# Patient Record
Sex: MALE | Race: WHITE | Employment: FULL TIME | URBAN - METROPOLITAN AREA
[De-identification: names, ages, dates, MRNs, and addresses within clinical notes are randomized per-mention and may not be internally consistent; named-entity substitution may affect disease eponyms.]

---

## 2018-04-20 ENCOUNTER — OFFICE VISIT (OUTPATIENT)
Dept: FAMILY MEDICINE | Facility: CLINIC | Age: 38
End: 2018-04-20
Payer: COMMERCIAL

## 2018-04-20 VITALS
SYSTOLIC BLOOD PRESSURE: 134 MMHG | TEMPERATURE: 98 F | DIASTOLIC BLOOD PRESSURE: 80 MMHG | WEIGHT: 218 LBS | BODY MASS INDEX: 29.53 KG/M2 | HEART RATE: 74 BPM | HEIGHT: 72 IN | RESPIRATION RATE: 16 BRPM

## 2018-04-20 DIAGNOSIS — F41.1 GAD (GENERALIZED ANXIETY DISORDER): Primary | ICD-10-CM

## 2018-04-20 PROCEDURE — 99203 OFFICE O/P NEW LOW 30 MIN: CPT | Performed by: NURSE PRACTITIONER

## 2018-04-20 RX ORDER — BUSPIRONE HYDROCHLORIDE 10 MG/1
10 TABLET ORAL 3 TIMES DAILY
Qty: 90 TABLET | Refills: 0 | Status: SHIPPED | OUTPATIENT
Start: 2018-04-20 | End: 2018-05-18

## 2018-04-20 ASSESSMENT — ANXIETY QUESTIONNAIRES
3. WORRYING TOO MUCH ABOUT DIFFERENT THINGS: NEARLY EVERY DAY
1. FEELING NERVOUS, ANXIOUS, OR ON EDGE: NEARLY EVERY DAY
4. TROUBLE RELAXING: NEARLY EVERY DAY
2. NOT BEING ABLE TO STOP OR CONTROL WORRYING: NEARLY EVERY DAY
6. BECOMING EASILY ANNOYED OR IRRITABLE: NEARLY EVERY DAY
5. BEING SO RESTLESS THAT IT IS HARD TO SIT STILL: NOT AT ALL
GAD7 TOTAL SCORE: 18
7. FEELING AFRAID AS IF SOMETHING AWFUL MIGHT HAPPEN: NEARLY EVERY DAY
GAD7 TOTAL SCORE: 18
GAD7 TOTAL SCORE: 18
7. FEELING AFRAID AS IF SOMETHING AWFUL MIGHT HAPPEN: NEARLY EVERY DAY

## 2018-04-20 ASSESSMENT — PATIENT HEALTH QUESTIONNAIRE - PHQ9
SUM OF ALL RESPONSES TO PHQ QUESTIONS 1-9: 3
SUM OF ALL RESPONSES TO PHQ QUESTIONS 1-9: 3
10. IF YOU CHECKED OFF ANY PROBLEMS, HOW DIFFICULT HAVE THESE PROBLEMS MADE IT FOR YOU TO DO YOUR WORK, TAKE CARE OF THINGS AT HOME, OR GET ALONG WITH OTHER PEOPLE: SOMEWHAT DIFFICULT

## 2018-04-20 ASSESSMENT — PAIN SCALES - GENERAL: PAINLEVEL: NO PAIN (0)

## 2018-04-20 NOTE — MR AVS SNAPSHOT
"              After Visit Summary   2018    Sidney Altamirano    MRN: [id number]           Patient Information     Date Of Birth          1980        Visit Information        Provider Department      2018 10:40 AM Elma Gaona APRN CNP [facility]        Today's Diagnoses     SUHAS (generalized anxiety disorder)    -  1       Follow-ups after your visit        Who to contact     If you have questions or need follow up information about today's clinic visit or your schedule please contact [facility] directly at [phone].  Normal or non-critical lab and imaging results will be communicated to you by Humhart, letter or phone within 4 business days after the clinic has received the results. If you do not hear from us within 7 days, please contact the clinic through Humhart or phone. If you have a critical or abnormal lab result, we will notify you by phone as soon as possible.  Submit refill requests through [employer] or call your pharmacy and they will forward the refill request to us. Please allow 3 business days for your refill to be completed.          Additional Information About Your Visit        MyChart Information     [employer] lets you send messages to your doctor, view your test results, renew your prescriptions, schedule appointments and more. To sign up, go to [email or web address] . Click on \"Log in\" on the left side of the screen, which will take you to the Welcome page. Then click on \"Sign up Now\" on the right side of the page.     You will be asked to enter the access code listed below, as well as some personal information. Please follow the directions to create your username and password.     Your access code is: [email or web address]  Expires: 2018 11:16 AM     Your access code will  in 90 days. If you need help or a new code, please call your [facility] or [phone].        Care EveryWhere ID     This is your Care EveryWhere ID. This could be used by " other organizations to access your [place] medical records  [id number]        Your Vitals Were     Pulse Temperature Respirations Height BMI (Body Mass Index)       74 98  F (36.7  C) (Temporal) 16 6' (1.829 m) 29.57 kg/m2        Blood Pressure from Last 3 Encounters:   04/20/18 134/80   07/10/07 138/90   02/08/07 126/82    Weight from Last 3 Encounters:   04/20/18 218 lb (98.9 kg)   07/10/07 215 lb (97.5 kg)   02/08/07 211 lb (95.7 kg)              Today, you had the following     No orders found for display         Today's Medication Changes          These changes are accurate as of 4/20/18 11:16 AM.  If you have any questions, ask your nurse or doctor.               Start taking these medicines.        Dose/Directions    busPIRone 10 MG tablet   Commonly known as:  BUSPAR   Used for:  SUHAS (generalized anxiety disorder)   Started by:  Elma Gaona APRN CNP        Dose:  10 mg   Take 1 tablet (10 mg) by mouth 3 times daily   Quantity:  90 tablet   Refills:  0       sertraline 50 MG tablet   Commonly known as:  ZOLOFT   Used for:  SUHAS (generalized anxiety disorder)   Started by:  Elma Gaona APRN CNP        Dose:  50 mg   Take 1 tablet (50 mg) by mouth daily   Quantity:  30 tablet   Refills:  1            Where to get your medicines      These medications were sent to [facility] [place] - [address]  [address] [place] [address], [place] [address]     Phone:  [phone]     busPIRone 10 MG tablet    sertraline 50 MG tablet                Primary Care Provider Office Phone # Fax #    Romario Barakat [phone] [address] [facility] [address]        Equal Access to Services     JODY CASTELLANOS : Veronica Henson, ely juarez, payal kaalmada kelsea, poly gonzalez. [facility] [phone].    ATENCIÓN: Si habla español, tiene a woo disposición servicios gratuitos de asistencia  lingüísticaPrashanth Rubin al [phone].    We comply with applicable federal civil rights laws and [place] laws. We do not discriminate on the basis of race, color, national origin, age, disability, sex, sexual orientation, or gender identity.            Thank you!     Thank you for choosing [facility]  for your care. Our goal is always to provide you with excellent care. Hearing back from our patients is one way we can continue to improve our services. Please take a few minutes to complete the written survey that you may receive in the mail after your visit with us. Thank you!             Your Updated Medication List - Protect others around you: Learn how to safely use, store and throw away your medicines at www.disposemymeds.org.          This list is accurate as of 4/20/18 11:16 AM.  Always use your most recent med list.                   Brand Name Dispense Instructions for use Diagnosis    busPIRone 10 MG tablet    BUSPAR    90 tablet    Take 1 tablet (10 mg) by mouth 3 times daily    SUHAS (generalized anxiety disorder)       sertraline 50 MG tablet    ZOLOFT    30 tablet    Take 1 tablet (50 mg) by mouth daily    SUHAS (generalized anxiety disorder)       VIAGRA 50 MG tablet   Generic drug:  sildenafil     10    ONE TABLET TAKEN AT LEAST 30 MINUTES BEFORE INTERCOURSE; pt will call to order    Impotence of organic origin

## 2018-04-20 NOTE — PROGRESS NOTES
SUBJECTIVE:   Sidney Altamirano is a 38 year old male who presents to clinic today for the following health issues:    Had tried Buspirone 5 mg in the past. Patient would like to go back on the medication ( see care every where at [facility] ).   History of Present Illness     Depression & Anxiety Follow-up:     Depression/Anxiety:  Depression & Anxiety    Status since last visit::  Worsened    Other associated symptoms of depression and anxiety::  None    Significant life event::  YES    Current substance use::  None       Today's PHQ-9         PHQ-9 Total Score:     (P) 3   PHQ-9 Q9 Suicidal ideation:   (P) Not at all   Thoughts of suicide or self harm:      Self-harm Plan:        Self-harm Action:          Safety concerns for self or others:       SUHAS-7 Total Score: (P) 18    Diet:  Regular (no restrictions)  Frequency of exercise:  None  Taking medications regularly:  Yes  Medication side effects:  Not applicable  Additional concerns today:  No    Abnormal Mood Symptoms  Onset: the last year 1.5 years    Description:   Depression: YES- situational   Anxiety: YES    Accompanying Signs & Symptoms:  Still participating in activities that you used to enjoy: no  Fatigue: no   Irritability: YES  Difficulty concentrating: no   Changes in appetite: no   Problems with sleep: no   Heart racing/beating fast : no   Thoughts of hurting yourself or others: none    History:   Recent stress: YES- family   Prior depression hospitalization: None  Family history of depression: no   Family history of anxiety: no     Precipitating factors:   Alcohol/drug use: not for 1 year.      Alleviating factors:      Therapies Tried and outcome: Buspar (Buspirone)  Problem list and histories reviewed & adjusted, as indicated.  Additional history: as documented    Patient reports to the clinic concerning anxiety and some depressive symptoms and possible medication treatment for it. He is a  and father. He has a diagnosis of low  "testosterone and he used get shots when he was on his ex-wife's insurance, but since his divorce his insurance now doesn't cover it or his previous anxiety meds (Buspirone and Zoloft in the past). He states that his ex-wife was having an affair with her boss for 3 years and that is what caused them to get a divorce. However, she tried to convince him that he was paranoid and anxious, not that she was cheating on him and so he is skeptical of his diagnosis. He has 50/50 custody of his kids with his ex-wife and his fiance has kids as well. He does not have a diagnosis of depression but he notes his fiance of 3 years has stage 4 cervical cancer with metastasis, so he has some slight depression concerning that. He states that they have not had any physical intimacy for over a year, which is hard as his \"love language\" is physical touch.    He notes that he has irritability and is currently in counseling. He states that her fiance's kids are not respectful to her and so he is irritated by that and tries so correct them, but also doesn't want to parent her kids. He reports that he has not been exercising as he is stressed and dealing with lots of doctors and insurance has drained him of energy.    Patient Active Problem List   Diagnosis     Hyperlipidemia     SUHAS (generalized anxiety disorder)     Low testosterone in male     Past Surgical History:   Procedure Laterality Date     VASECTOMY  2012       Social History   Substance Use Topics     Smoking status: Never Smoker     Smokeless tobacco: Never Used     Alcohol use No     Family History   Problem Relation Age of Onset     Hypertension Father      C.A.D. Father      C.A.D. Paternal Grandfather          Current Outpatient Prescriptions   Medication Sig Dispense Refill     busPIRone (BUSPAR) 10 MG tablet Take 1 tablet (10 mg) by mouth 3 times daily 90 tablet 0     sertraline (ZOLOFT) 50 MG tablet Take 1 tablet (50 mg) by mouth daily 30 tablet 1     VIAGRA 50 MG OR TABS " ONE TABLET TAKEN AT LEAST 30 MINUTES BEFORE INTERCOURSE; pt will call to order (Patient not taking: No sig reported) 10 1 YEAR     Allergies   Allergen Reactions     Ceclor [Cefaclor]      Penicillin [Esters]      No lab results found.   BP Readings from Last 3 Encounters:   04/20/18 134/80   07/10/07 138/90   02/08/07 126/82    Wt Readings from Last 3 Encounters:   04/20/18 218 lb (98.9 kg)   07/10/07 215 lb (97.5 kg)   02/08/07 211 lb (95.7 kg)        ROS:  Constitutional, HEENT, cardiovascular, pulmonary, GI, , musculoskeletal, neuro, skin, endocrine and psych systems are negative, except as otherwise noted.    This document serves as a record of the services and decisions personally performed and made by Elma Gaona CNP. It was created on his/her behalf by Morenita Hallman, trained medical scribe. The creation of this document is based the provider's statements to the medical scribes.    Scribe Morenita Hallman 10:52 AM, April 20, 2018  OBJECTIVE:     /80  Pulse 74  Temp 98  F (36.7  C) (Temporal)  Resp 16  Ht 6' (1.829 m)  Wt 218 lb (98.9 kg)  BMI 29.57 kg/m2  Body mass index is 29.57 kg/(m^2).     GENERAL: healthy, alert and no distress  NECK: no adenopathy, no asymmetry, masses, or scars and thyroid normal to palpation  RESP: lungs clear to auscultation - no rales, rhonchi or wheezes  CV: regular rate and rhythm, normal S1 S2, no S3 or S4, no murmur, click or rub, no peripheral edema and peripheral pulses strong  NEURO: Normal strength and tone, mentation intact and speech normal  PSYCH: in work clothes, normal judgement, insight and eye contact. Pleasant-    Diagnostic Test Results:  No results found for this or any previous visit (from the past 24 hour(s)).  ASSESSMENT/PLAN:       ICD-10-CM    1. SUHAS (generalized anxiety disorder) F41.1 busPIRone (BUSPAR) 10 MG tablet     sertraline (ZOLOFT) 50 MG tablet     Discussed anxiety at length with patient. Advised starting Buspirone 10 mg daily and  Sertraline 50 mg daily. Rx provided. Start Sertraline 50 mg every other day for the first week, then 50 mg every day for the rest of the month. Reviewed directions, benefits, and side effects of medication with patient. Advised continuing with his counseling as planned, exercising regularly and eating healthy.    Follow up with PCP with a phone or MyChart visit in 3.5 weeks for anxiety medication re-check.- virtual visit.     The information in this document, created by the medical scribe for me, accurately reflects the services I personally performed and the decisions made by me. I have reviewed and approved this document for accuracy prior to leaving the patient care area.  Elma Gaona CNP  10:52 AM, 04/20/18    MICH Bansal CNP  [facility]

## 2018-04-21 ASSESSMENT — PATIENT HEALTH QUESTIONNAIRE - PHQ9: SUM OF ALL RESPONSES TO PHQ QUESTIONS 1-9: 3

## 2018-04-21 ASSESSMENT — ANXIETY QUESTIONNAIRES: GAD7 TOTAL SCORE: 18

## 2018-05-02 ENCOUNTER — MYC MEDICAL ADVICE (OUTPATIENT)
Dept: FAMILY MEDICINE | Facility: CLINIC | Age: 38
End: 2018-05-02

## 2018-05-02 ENCOUNTER — E-VISIT (OUTPATIENT)
Dept: FAMILY MEDICINE | Facility: CLINIC | Age: 38
End: 2018-05-02
Payer: COMMERCIAL

## 2018-05-02 DIAGNOSIS — F41.1 GAD (GENERALIZED ANXIETY DISORDER): Primary | ICD-10-CM

## 2018-05-02 PROCEDURE — 98969 ZZC NONPHYSICIAN ONLINE ASSESSMENT AND MANAGEMENT: CPT | Performed by: NURSE PRACTITIONER

## 2018-05-02 RX ORDER — SERTRALINE HYDROCHLORIDE 100 MG/1
100 TABLET, FILM COATED ORAL DAILY
Qty: 30 TABLET | Refills: 1 | Status: SHIPPED | OUTPATIENT
Start: 2018-05-02 | End: 2018-06-27

## 2018-05-02 ASSESSMENT — ANXIETY QUESTIONNAIRES
GAD7 TOTAL SCORE: 10
3. WORRYING TOO MUCH ABOUT DIFFERENT THINGS: MORE THAN HALF THE DAYS
2. NOT BEING ABLE TO STOP OR CONTROL WORRYING: MORE THAN HALF THE DAYS
6. BECOMING EASILY ANNOYED OR IRRITABLE: MORE THAN HALF THE DAYS
1. FEELING NERVOUS, ANXIOUS, OR ON EDGE: SEVERAL DAYS
7. FEELING AFRAID AS IF SOMETHING AWFUL MIGHT HAPPEN: MORE THAN HALF THE DAYS
GAD7 TOTAL SCORE: 10
7. FEELING AFRAID AS IF SOMETHING AWFUL MIGHT HAPPEN: MORE THAN HALF THE DAYS
4. TROUBLE RELAXING: SEVERAL DAYS
5. BEING SO RESTLESS THAT IT IS HARD TO SIT STILL: NOT AT ALL

## 2018-05-03 ASSESSMENT — ANXIETY QUESTIONNAIRES: GAD7 TOTAL SCORE: 10

## 2018-05-18 DIAGNOSIS — F41.1 GAD (GENERALIZED ANXIETY DISORDER): ICD-10-CM

## 2018-05-21 RX ORDER — BUSPIRONE HYDROCHLORIDE 10 MG/1
TABLET ORAL
Qty: 90 TABLET | Refills: 0 | Status: SHIPPED | OUTPATIENT
Start: 2018-05-21 | End: 2018-06-27

## 2018-05-21 NOTE — TELEPHONE ENCOUNTER
"Requested Prescriptions   Pending Prescriptions Disp Refills     busPIRone (BUSPAR) 10 MG tablet [Pharmacy Med Name: BusPIRone HCL 10 MG TAB 10 TAB] 90 tablet 0     Sig: TAKE 1 TABLET (10 MG) BY MOUTH 3 TIMES DAILY    Atypical Antidepressants Protocol Passed    5/18/2018 12:45 PM       Passed - Recent (12 mo) or future (30 days) visit within the authorizing provider's specialty    Patient had office visit in the last 12 months or has a visit in the next 30 days with authorizing provider or within the authorizing provider's specialty.  See \"Patient Info\" tab in inbasket, or \"Choose Columns\" in Meds & Orders section of the refill encounter.           Passed - Patient is age 18 or older        Routing refill request to provider for review/approval because:  Patient needs to be seen because:  Per 4/20/18 OV plan, pt to f/u in 3 weeks.    Desiree Negerte RN          "

## 2018-06-27 ENCOUNTER — E-VISIT (OUTPATIENT)
Dept: FAMILY MEDICINE | Facility: CLINIC | Age: 38
End: 2018-06-27
Payer: COMMERCIAL

## 2018-06-27 ENCOUNTER — MYC REFILL (OUTPATIENT)
Dept: FAMILY MEDICINE | Facility: CLINIC | Age: 38
End: 2018-06-27

## 2018-06-27 DIAGNOSIS — F41.1 GAD (GENERALIZED ANXIETY DISORDER): ICD-10-CM

## 2018-06-27 PROCEDURE — 98969 ZZC NONPHYSICIAN ONLINE ASSESSMENT AND MANAGEMENT: CPT | Performed by: NURSE PRACTITIONER

## 2018-06-27 RX ORDER — BUSPIRONE HYDROCHLORIDE 10 MG/1
TABLET ORAL
Qty: 90 TABLET | Refills: 0 | Status: CANCELLED | OUTPATIENT
Start: 2018-06-27

## 2018-06-27 RX ORDER — SERTRALINE HYDROCHLORIDE 100 MG/1
100 TABLET, FILM COATED ORAL DAILY
Qty: 90 TABLET | Refills: 1 | Status: SHIPPED | OUTPATIENT
Start: 2018-06-27

## 2018-06-27 RX ORDER — SERTRALINE HYDROCHLORIDE 100 MG/1
100 TABLET, FILM COATED ORAL DAILY
Qty: 30 TABLET | Refills: 1 | Status: CANCELLED | OUTPATIENT
Start: 2018-06-27

## 2018-06-27 RX ORDER — BUSPIRONE HYDROCHLORIDE 10 MG/1
TABLET ORAL
Qty: 270 TABLET | Refills: 1 | Status: SHIPPED | OUTPATIENT
Start: 2018-06-27

## 2018-06-27 ASSESSMENT — ANXIETY QUESTIONNAIRES
1. FEELING NERVOUS, ANXIOUS, OR ON EDGE: NOT AT ALL
3. WORRYING TOO MUCH ABOUT DIFFERENT THINGS: NOT AT ALL
5. BEING SO RESTLESS THAT IT IS HARD TO SIT STILL: NOT AT ALL
GAD7 TOTAL SCORE: 0
2. NOT BEING ABLE TO STOP OR CONTROL WORRYING: NOT AT ALL
IF YOU CHECKED OFF ANY PROBLEMS ON THIS QUESTIONNAIRE, HOW DIFFICULT HAVE THESE PROBLEMS MADE IT FOR YOU TO DO YOUR WORK, TAKE CARE OF THINGS AT HOME, OR GET ALONG WITH OTHER PEOPLE: NOT DIFFICULT AT ALL
7. FEELING AFRAID AS IF SOMETHING AWFUL MIGHT HAPPEN: NOT AT ALL
6. BECOMING EASILY ANNOYED OR IRRITABLE: NOT AT ALL

## 2018-06-27 ASSESSMENT — PATIENT HEALTH QUESTIONNAIRE - PHQ9: 5. POOR APPETITE OR OVEREATING: NOT AT ALL

## 2018-06-27 NOTE — TELEPHONE ENCOUNTER
Pt called back and completed the PHQ9/SUHAS. Please review and advise. Pt states that he would like to continue with the current plan.   Henry Teresa MA  June 27, 2018

## 2018-06-27 NOTE — TELEPHONE ENCOUNTER
Left message for pt.   When pt return call, please complete PHQ/SUHAS over the phone and also inform pt the my chart message if pt haven't already read the message.

## 2018-06-27 NOTE — TELEPHONE ENCOUNTER
Message from [employer]:  Original authorizing provider: MICH Bansal CNPothy TIKI Altamirano would like a refill of the following medications:  sertraline (ZOLOFT) 100 MG tablet [MICH Bansal CNP]  busPIRone (BUSPAR) 10 MG tablet [MICH Bansal CNP]    Preferred pharmacy: [facility] [place] - [address] [address]    Comment:  i submitted the E visit not sure if i did it right. everything is going good i would like to continue this same dosage for the rest of the year please. there wasnt a place to put a note like this in the e visit

## 2018-06-27 NOTE — TELEPHONE ENCOUNTER
It looks like pt return call and completed the questionnaire.     PHQ-9 SCORE 4/20/2018 6/27/2018   Total Score MyChart 3 (Minimal depression) -   Total Score 3 0     SUHAS-7 SCORE 4/20/2018 5/2/2018 6/27/2018   Total Score 18 (severe anxiety) 10 (moderate anxiety) -   Total Score 18 10 0     Guzman Reveles MA

## 2018-06-27 NOTE — MR AVS SNAPSHOT
After Visit Summary   6/27/2018    Sidney Altamirano    MRN: [id number]           Patient Information     Date Of Birth          1980        Visit Information        Provider Department      6/27/2018 12:45 PM Elma Gaona APRN CNP [facility]        Today's Diagnoses     SUHAS (generalized anxiety disorder)           Follow-ups after your visit        Who to contact     If you have questions or need follow up information about today's clinic visit or your schedule [facility] at [phone].  Normal or non-critical lab and imaging results will be communicated to you by MyChart, letter or phone within 4 business days after the clinic has received the results. If you do not hear from us within 7 days, please contact the clinic through [employer] or phone. If you have a critical or abnormal lab result, we will notify you by phone as soon as possible.  Submit refill requests through [employer] or call your pharmacy and they will forward the refill request to us. Please allow 3 business days for your refill to be completed.          Additional Information About Your Visit        MyChart Information     [employer] gives you secure access to your electronic health record. If you see a primary care provider, you can also send messages to your care team and make appointments. If you have questions, please call your primary care clinic.  If you do not have a primary care provider, please call [phone] and they will assist you.        Care EveryWhere ID     This is your Care EveryWhere ID. This could be used by other organizations to access your [place] medical records  [id number]         Blood Pressure from Last 3 Encounters:   04/20/18 134/80   07/10/07 138/90   02/08/07 126/82    Weight from Last 3 Encounters:   04/20/18 218 lb (98.9 kg)   07/10/07 215 lb (97.5 kg)   02/08/07 211 lb (95.7 kg)              Today, you had the following     No orders found for  display         Today's Medication Changes          These changes are accurate as of 6/27/18  2:22 PM.  If you have any questions, ask your nurse or doctor.               These medicines have changed or have updated prescriptions.        Dose/Directions    sertraline 100 MG tablet   Commonly known as:  ZOLOFT   This may have changed:  Another medication with the same name was removed. Continue taking this medication, and follow the directions you see here.   Used for:  SUHAS (generalized anxiety disorder)   Changed by:  Elma Gaona APRN CNP        Dose:  100 mg   Take 1 tablet (100 mg) by mouth daily   Quantity:  90 tablet   Refills:  1            Where to get your medicines      These medications were sent to [facility] [place] - [address]  [address], [place] [address]     Phone:  [phone]     busPIRone 10 MG tablet    sertraline 100 MG tablet                Primary Care Provider Office Phone # Fax #    Romario Barakat [phone] [address] [facility] [address]        Equal Access to Services     [facility]: Hadii aad ku hadasho Soomaali, waaxda luqadaha, qaybta kaalmada adeegyada, waxay frenchin hayganga arthur . So [facility] [phone].    ATENCIÓN: Si habla español, tiene a woo disposición servicios gratuitos de asistencia lingüística. Mary Kateame al [phone].    We comply with applicable federal civil rights laws and [place] laws. We do not discriminate on the basis of race, color, national origin, age, disability, sex, sexual orientation, or gender identity.            Thank you!     Thank you for choosing [facility]  for your care. Our goal is always to provide you with excellent care. Hearing back from our patients is one way we can continue to improve our services. Please take a few minutes to complete the written survey that you may receive in the mail after your visit  with us. Thank you!             Your Updated Medication List - Protect others around you: Learn how to safely use, store and throw away your medicines at www.disposemymeds.org.          This list is accurate as of 6/27/18  2:22 PM.  Always use your most recent med list.                   Brand Name Dispense Instructions for use Diagnosis    busPIRone 10 MG tablet    BUSPAR    270 tablet    TAKE 1 TABLET (10 MG) BY MOUTH 3 TIMES DAILY    SUHAS (generalized anxiety disorder)       sertraline 100 MG tablet    ZOLOFT    90 tablet    Take 1 tablet (100 mg) by mouth daily    SUHAS (generalized anxiety disorder)       VIAGRA 50 MG tablet   Generic drug:  sildenafil     10    ONE TABLET TAKEN AT LEAST 30 MINUTES BEFORE INTERCOURSE; pt will call to order    Impotence of organic origin

## 2018-06-28 ASSESSMENT — ANXIETY QUESTIONNAIRES: GAD7 TOTAL SCORE: 0

## 2018-06-28 ASSESSMENT — PATIENT HEALTH QUESTIONNAIRE - PHQ9: SUM OF ALL RESPONSES TO PHQ QUESTIONS 1-9: 0

## 2018-11-06 DIAGNOSIS — F41.1 GAD (GENERALIZED ANXIETY DISORDER): ICD-10-CM

## 2018-11-07 RX ORDER — SERTRALINE HYDROCHLORIDE 100 MG/1
TABLET, FILM COATED ORAL
Qty: 90 TABLET | Refills: 1 | OUTPATIENT
Start: 2018-11-07

## 2018-11-07 NOTE — TELEPHONE ENCOUNTER
Zoloft:  Sent 6/27/18 with 6 month supply. Refill not appropriate at this time.     Rubia Stokes, RN, BSN

## 2020-02-08 ENCOUNTER — HEALTH MAINTENANCE LETTER (OUTPATIENT)
Age: 40
End: 2020-02-08

## 2020-11-08 ENCOUNTER — HEALTH MAINTENANCE LETTER (OUTPATIENT)
Age: 40
End: 2020-11-08

## 2021-03-27 ENCOUNTER — HEALTH MAINTENANCE LETTER (OUTPATIENT)
Age: 41
End: 2021-03-27

## 2021-09-11 ENCOUNTER — HEALTH MAINTENANCE LETTER (OUTPATIENT)
Age: 41
End: 2021-09-11

## 2022-04-23 ENCOUNTER — HEALTH MAINTENANCE LETTER (OUTPATIENT)
Age: 42
End: 2022-04-23

## 2022-10-29 ENCOUNTER — HEALTH MAINTENANCE LETTER (OUTPATIENT)
Age: 42
End: 2022-10-29

## 2023-06-01 ENCOUNTER — HEALTH MAINTENANCE LETTER (OUTPATIENT)
Age: 43
End: 2023-06-01